# Patient Record
Sex: MALE | Race: WHITE | NOT HISPANIC OR LATINO | Employment: FULL TIME
[De-identification: names, ages, dates, MRNs, and addresses within clinical notes are randomized per-mention and may not be internally consistent; named-entity substitution may affect disease eponyms.]

---

## 2023-04-17 ENCOUNTER — TRANSCRIBE ORDERS (OUTPATIENT)
Dept: OTHER | Age: 68
End: 2023-04-17

## 2023-04-17 DIAGNOSIS — M25.552 LEFT HIP PAIN: Primary | ICD-10-CM

## 2023-05-10 ENCOUNTER — THERAPY VISIT (OUTPATIENT)
Dept: PHYSICAL THERAPY | Facility: CLINIC | Age: 68
End: 2023-05-10
Attending: INTERNAL MEDICINE
Payer: COMMERCIAL

## 2023-05-10 DIAGNOSIS — M25.552 HIP PAIN, LEFT: Primary | ICD-10-CM

## 2023-05-10 PROCEDURE — 97110 THERAPEUTIC EXERCISES: CPT | Mod: GP | Performed by: PHYSICAL THERAPIST

## 2023-05-10 PROCEDURE — 97161 PT EVAL LOW COMPLEX 20 MIN: CPT | Mod: GP | Performed by: PHYSICAL THERAPIST

## 2023-05-10 ASSESSMENT — ACTIVITIES OF DAILY LIVING (ADL)
RECREATIONAL_ACTIVITIES: SLIGHT DIFFICULTY
WALKING_DOWN_STEEP_HILLS: SLIGHT DIFFICULTY
HOS_ADL_HIGHEST_POTENTIAL_SCORE: 68
GOING_DOWN_1_FLIGHT_OF_STAIRS: NO DIFFICULTY AT ALL
TWISTING/PIVOTING_ON_INVOLVED_LEG: NO DIFFICULTY AT ALL
HOS_ADL_SCORE(%): 85.29
GETTING_INTO_AND_OUT_OF_AN_AVERAGE_CAR: MODERATE DIFFICULTY
SITTING_FOR_15_MINUTES: NO DIFFICULTY AT ALL
WALKING_INITIALLY: SLIGHT DIFFICULTY
ROLLING_OVER_IN_BED: SLIGHT DIFFICULTY
LIGHT_TO_MODERATE_WORK: NO DIFFICULTY AT ALL
HEAVY_WORK: NO DIFFICULTY AT ALL
HOS_ADL_COUNT: 17
HOW_WOULD_YOU_RATE_YOUR_CURRENT_LEVEL_OF_FUNCTION_DURING_YOUR_USUAL_ACTIVITIES_OF_DAILY_LIVING_FROM_0_TO_100_WITH_100_BEING_YOUR_LEVEL_OF_FUNCTION_PRIOR_TO_YOUR_HIP_PROBLEM_AND_0_BEING_THE_INABILITY_TO_PERFORM_ANY_OF_YOUR_USUAL_DAILY_ACTIVITIES?: 80
STEPPING_UP_AND_DOWN_CURBS: NO DIFFICULTY AT ALL
WALKING_UP_STEEP_HILLS: SLIGHT DIFFICULTY
GOING_UP_1_FLIGHT_OF_STAIRS: SLIGHT DIFFICULTY
PUTTING_ON_SOCKS_AND_SHOES: NO DIFFICULTY AT ALL
HOS_ADL_ITEM_SCORE_TOTAL: 58
GETTING_INTO_AND_OUT_OF_A_BATHTUB: NO DIFFICULTY AT ALL
DEEP_SQUATTING: NO DIFFICULTY AT ALL
WALKING_15_MINUTES_OR_GREATER: SLIGHT DIFFICULTY
STANDING_FOR_15_MINUTES: NO DIFFICULTY AT ALL
WALKING_APPROXIMATELY_10_MINUTES: SLIGHT DIFFICULTY

## 2023-05-10 NOTE — PROGRESS NOTES
"Physical Therapy Initial Evaluation  Subjective:  The history is provided by the patient. No  was used.   Patient Health History  Rajan Fernandez being seen for Left hip pain.     Problem began: 11/10/2022.   Problem occurred: gradual onset with no specific injury   Pain is reported as 3/10 on pain scale.  General health as reported by patient is good.  Pertinent medical history includes: none.   Red flags:  None as reported by patient.  Medical allergies: none.   Surgeries include:  None.    Current medications:  Anti-depressants, anti-inflammatory and cardiac medication.    Current occupation is .                     Therapist Generated HPI Evaluation  Problem details: -Gradual increase in left hip pain over the last few years  -Mostly in front of the hip  -Described as a deeper and \"vague\" pain  -No treatments have felt helpful  -Often walks about 3 miles (at least a few times per week) that can fatigue the hip.         Type of problem:  Left hip.    This is a chronic condition.  Condition occurred with:  Insidious onset.  Where condition occurred: for unknown reasons.  Patient reports pain:  Anterior and joint.  Pain is described as aching and is intermittent.  Pain radiates to:  No radiation. Pain is the same all the time.  Since onset symptoms are unchanged.  Associated symptoms:  Loss of motion/stiffness and loss of strength. Exacerbated by: getting into/out of car, prolonged activity; standing up after sitting for period.  Relieved by: not much helpful; avoiding provoking activities.  Imaging testing: none recently.  Past treatment: none. There was none improvement following previous treatment.  Restrictions due to condition include:  Working in normal job without restrictions.  Barriers include:  None as reported by patient.                        Objective:  System                                           Hip Evaluation  HIP AROM:    Flexion: Left: WNL    Right:  " WNL      Abduction: Left:  WNL    Right:  WNL      Internal Rotation: Left: 32    Right: 38  External Rotation: Left: 35    Right: 35        Hip Strength:    Flexion:   Left: 5/5   Pain:  Right: 5/5   Pain:                    Extension:  Left: 5/5  Pain:Right: 5/5    Pain:                                     General     ROS    Assessment/Plan:    Patient is a 67 year old male with left side hip complaints.    Patient has the following significant findings with corresponding treatment plan.                Diagnosis 1:  Chronic left hip pain  Pain -  hot/cold therapy, manual therapy, self management, education, directional preference exercise and home program  Decreased ROM/flexibility - manual therapy, therapeutic exercise and home program  Decreased joint mobility - manual therapy, therapeutic exercise and home program  Decreased strength - therapeutic exercise, therapeutic activities and home program  Impaired muscle performance - neuro re-education and home program  Decreased function - therapeutic activities and home program    Therapy Evaluation Codes:   Cumulative Therapy Evaluation is: Low complexity.    Previous and current functional limitations:  (See Goal Flow Sheet for this information)    Short term and Long term goals: (See Goal Flow Sheet for this information)     Communication ability:  Patient appears to be able to clearly communicate and understand verbal and written communication and follow directions correctly.  Treatment Explanation - The following has been discussed with the patient:   RX ordered/plan of care  Anticipated outcomes  Possible risks and side effects  This patient would benefit from PT intervention to resume normal activities.   Rehab potential is good.    Frequency:  1 X week, once daily  Duration:  for 8 weeks  Discharge Plan:  Achieve all LTG.  Independent in home treatment program.  Reach maximal therapeutic benefit.    Please refer to the daily flowsheet for treatment today,  total treatment time and time spent performing 1:1 timed codes.

## 2023-05-22 ENCOUNTER — THERAPY VISIT (OUTPATIENT)
Dept: PHYSICAL THERAPY | Facility: CLINIC | Age: 68
End: 2023-05-22
Payer: COMMERCIAL

## 2023-05-22 DIAGNOSIS — M25.552 HIP PAIN, LEFT: Primary | ICD-10-CM

## 2023-05-22 PROCEDURE — 97112 NEUROMUSCULAR REEDUCATION: CPT | Mod: GP | Performed by: PHYSICAL THERAPIST

## 2023-05-22 PROCEDURE — 97110 THERAPEUTIC EXERCISES: CPT | Mod: GP | Performed by: PHYSICAL THERAPIST

## 2023-06-14 ENCOUNTER — THERAPY VISIT (OUTPATIENT)
Dept: PHYSICAL THERAPY | Facility: CLINIC | Age: 68
End: 2023-06-14
Payer: COMMERCIAL

## 2023-06-14 DIAGNOSIS — M25.552 HIP PAIN, LEFT: Primary | ICD-10-CM

## 2023-06-14 PROCEDURE — 97140 MANUAL THERAPY 1/> REGIONS: CPT | Mod: GP | Performed by: PHYSICAL THERAPIST

## 2023-06-14 PROCEDURE — 97112 NEUROMUSCULAR REEDUCATION: CPT | Mod: GP | Performed by: PHYSICAL THERAPIST

## 2023-06-14 PROCEDURE — 97110 THERAPEUTIC EXERCISES: CPT | Mod: GP | Performed by: PHYSICAL THERAPIST

## 2023-06-30 ENCOUNTER — THERAPY VISIT (OUTPATIENT)
Dept: PHYSICAL THERAPY | Facility: CLINIC | Age: 68
End: 2023-06-30
Payer: COMMERCIAL

## 2023-06-30 DIAGNOSIS — M25.552 HIP PAIN, LEFT: Primary | ICD-10-CM

## 2023-06-30 PROCEDURE — 97112 NEUROMUSCULAR REEDUCATION: CPT | Mod: GP | Performed by: PHYSICAL THERAPIST

## 2023-06-30 PROCEDURE — 97110 THERAPEUTIC EXERCISES: CPT | Mod: GP | Performed by: PHYSICAL THERAPIST

## 2023-06-30 ASSESSMENT — ACTIVITIES OF DAILY LIVING (ADL)
HOS_ADL_SCORE(%): 94.12
WALKING_DOWN_STEEP_HILLS: NO DIFFICULTY AT ALL
GOING_DOWN_1_FLIGHT_OF_STAIRS: NO DIFFICULTY AT ALL
GETTING_INTO_AND_OUT_OF_A_BATHTUB: NO DIFFICULTY AT ALL
RECREATIONAL_ACTIVITIES: NO DIFFICULTY AT ALL
HOS_ADL_HIGHEST_POTENTIAL_SCORE: 68
LIGHT_TO_MODERATE_WORK: NO DIFFICULTY AT ALL
TWISTING/PIVOTING_ON_INVOLVED_LEG: SLIGHT DIFFICULTY
ROLLING_OVER_IN_BED: NO DIFFICULTY AT ALL
WALKING_INITIALLY: NO DIFFICULTY AT ALL
PUTTING_ON_SOCKS_AND_SHOES: NO DIFFICULTY AT ALL
SITTING_FOR_15_MINUTES: NO DIFFICULTY AT ALL
HOW_WOULD_YOU_RATE_YOUR_CURRENT_LEVEL_OF_FUNCTION_DURING_YOUR_USUAL_ACTIVITIES_OF_DAILY_LIVING_FROM_0_TO_100_WITH_100_BEING_YOUR_LEVEL_OF_FUNCTION_PRIOR_TO_YOUR_HIP_PROBLEM_AND_0_BEING_THE_INABILITY_TO_PERFORM_ANY_OF_YOUR_USUAL_DAILY_ACTIVITIES?: 75
GETTING_INTO_AND_OUT_OF_AN_AVERAGE_CAR: SLIGHT DIFFICULTY
STEPPING_UP_AND_DOWN_CURBS: NO DIFFICULTY AT ALL
HOS_ADL_ITEM_SCORE_TOTAL: 64
HEAVY_WORK: NO DIFFICULTY AT ALL
STANDING_FOR_15_MINUTES: NO DIFFICULTY AT ALL
GOING_UP_1_FLIGHT_OF_STAIRS: SLIGHT DIFFICULTY
WALKING_UP_STEEP_HILLS: SLIGHT DIFFICULTY
WALKING_APPROXIMATELY_10_MINUTES: NO DIFFICULTY AT ALL
HOS_ADL_COUNT: 17
WALKING_15_MINUTES_OR_GREATER: NO DIFFICULTY AT ALL
DEEP_SQUATTING: NO DIFFICULTY AT ALL

## 2023-07-28 ENCOUNTER — THERAPY VISIT (OUTPATIENT)
Dept: PHYSICAL THERAPY | Facility: CLINIC | Age: 68
End: 2023-07-28
Payer: COMMERCIAL

## 2023-07-28 DIAGNOSIS — M25.552 HIP PAIN, LEFT: Primary | ICD-10-CM

## 2023-07-28 PROCEDURE — 97112 NEUROMUSCULAR REEDUCATION: CPT | Mod: GP | Performed by: PHYSICAL THERAPIST

## 2023-07-28 PROCEDURE — 97110 THERAPEUTIC EXERCISES: CPT | Mod: GP | Performed by: PHYSICAL THERAPIST

## 2023-09-01 ENCOUNTER — THERAPY VISIT (OUTPATIENT)
Dept: PHYSICAL THERAPY | Facility: CLINIC | Age: 68
End: 2023-09-01
Payer: COMMERCIAL

## 2023-09-01 DIAGNOSIS — M25.552 HIP PAIN, LEFT: Primary | ICD-10-CM

## 2023-09-01 PROCEDURE — 97110 THERAPEUTIC EXERCISES: CPT | Mod: GP | Performed by: PHYSICAL THERAPIST

## 2023-09-01 PROCEDURE — 97112 NEUROMUSCULAR REEDUCATION: CPT | Mod: GP | Performed by: PHYSICAL THERAPIST

## 2023-09-01 PROCEDURE — 97140 MANUAL THERAPY 1/> REGIONS: CPT | Mod: GP | Performed by: PHYSICAL THERAPIST

## 2023-11-28 ENCOUNTER — TRANSFERRED RECORDS (OUTPATIENT)
Dept: HEALTH INFORMATION MANAGEMENT | Facility: CLINIC | Age: 68
End: 2023-11-28

## 2024-02-01 ENCOUNTER — TRANSFERRED RECORDS (OUTPATIENT)
Dept: HEALTH INFORMATION MANAGEMENT | Facility: CLINIC | Age: 69
End: 2024-02-01

## 2024-03-14 PROBLEM — M25.552 HIP PAIN, LEFT: Status: RESOLVED | Noted: 2023-05-10 | Resolved: 2024-03-14

## 2024-03-14 NOTE — PROGRESS NOTES
09/01/23 0500   Appointment Info   Signing clinician's name / credentials rohitsonali duval PT   Total/Authorized Visits 8   Visits Used 5   Medical Diagnosis left hip pain   PT Tx Diagnosis left hip joint pain   Progress Note/Certification   Progress Note Due Date 09/01/23   Progress Note Completed Date 05/10/23   PT Goal 1   Goal Identifier 1   Goal Description self cares, transferes pl 0/10   Rationale to maximize safety and independence with performance of ADLs and functional tasks;to maximize safety and independence within the home;to maximize safety and independence with transportation;to maximize safety and independence with self cares   Goal Progress self cares, transfers pl 1/10   Target Date 09/05/23  (improving decrease in frequency)   Subjective Report   Subjective Report I have some good days/bad days.  I am not sure what it the causes trying to work on my gait.  I am about 80% of normal   Objective Measures   Objective Measures Objective Measure 1;Objective Measure 2;Objective Measure 3   Objective Measure 1   Objective Measure hip abduction, gluteal (extension)   Details left 5-/5 better recuritment, left 5-/5   Objective Measure 2   Objective Measure balance   Details left 30 sec, right 25 sec   Objective Measure 3   Objective Measure hip ROM   Details general WFL, tightness in ASIS, quads and anterior thigh   Treatment Interventions (PT)   Interventions Therapeutic Procedure/Exercise;Neuromuscular Re-education;Manual Therapy   Therapeutic Procedure/Exercise   Therapeutic Procedures: strength, endurance, ROM, flexibility minutes (32707) 18   Therapeutic Procedures Ther Proc 2;Ther Proc 3   Ther Proc 1 PROM left hip in supine/prone   Ther Proc 1 - Details bilateral   Ther Proc 2 hip extension standing with TB   Ther Proc 2 - Details HEP red x 15   PTRx Ther Proc 1 Bridging #4 bend knee   PTRx Ther Proc 1 - Details cueing to watch posture and progression   PTRx Ther Proc 2 Hip Abduction Straight  Leg Raise   PTRx Ther Proc 2 - Details REV x20 to fatigue watching leg position resting between each rep   PTRx Ther Proc 3 Clamshell with Theraband   PTRx Ther Proc 3 - Details REV uses manual pressure instead of pain pillow between the knees  using red 15 x working to 30 1x/ day cueing for hip control and no increase in pain   PTRx Ther Proc 4 Piriformis Stretch Above 90 Degress Supine   PTRx Ther Proc 4 - Details manual stretching  move knee to opposite side moving toward your hip move knee towards shoulder 2 x 30 sec watching low back position 2x/ day   PTRx Ther Proc 5 Pretzel Stretch   PTRx Ther Proc 5 - Details watching back and leg position 2x 30 sec x 2x/ day   PTRx Ther Proc 6 kneeling hip flexos with ceuing for core adn knee position   PTRx Ther Proc 6 - Details how to stretch with walking 20 sec x2 reviewed watching pevlis stability   PTRx Ther Proc 7  Hip Flexor Stretch Petar Test Position   PTRx Ther Proc 7 - Details reviwed, or prone with foot on the ground tightness in quadriceps   Skilled Intervention cueing for posture and stretching to anterior thigh/ITB   Patient Response/Progress increase in recruitment almost normal   Ther Proc 3 foam roller   Ther Proc 3 - Details quadriceps/ITB   Neuromuscular Re-education   Neuromuscular re-ed of mvmt, balance, coord, kinesthetic sense, posture, proprioception minutes (88094) 10   Neuromuscular Re-education Neuro Re-ed 3;Neuro Re-ed 4;Neuro Re-ed 5;Neuro Re-ed 6   Neuro Re-ed 1 step up 5/6 inch step   Neuro Re-ed 1 - Details cueing for HEP gluteal control to do through out normal activity   Neuro Re-ed 3 SLS   Neuro Re-ed 3 - Details 1 legged balance 2 x 30 sec with even ground progression to carpet, yoga mat, watching contorl, pillow, turn the head, closed eyes   Neuro Re-ed 4 BOSU   Neuro Re-ed 4 - Details HEP dome side 2 legged with counter for balance progression to head turning, flat side minisquats watching L/E alignement   Neuro Re-ed 5 side stepping  and backwards   Neuro Re-ed 5 - Details with red theraband 40 feet x 6   PTRx Neuro Re-ed 1 lunges forward, sideways   PTRx Neuro Re-ed 1 - Details HEP on to BOSU watching hip and knee alignment   PTRx Neuro Re-ed 2 Education Sheet Hip    PTRx Neuro Re-ed 2 - Details walking forward, backwards, sideways and activating of gluteal and holding posture, cueing for gluteal and hip position increase with bands, increase in step length   Skilled Intervention general activity cueing to avoid hip flexors activity   Patient Response/Progress tolerated fair   Neuro Re-ed 6 walking   Neuro Re-ed 6 - Details forward, gluteal, watching postion slope of the road, try walking backwards to deactivate the hip flexors   Manual Therapy   Manual Therapy: Mobilization, MFR, MLD, friction massage minutes ([id number]) 10   Manual Therapy Manual Therapy 2   Manual Therapy 1 prone   Manual Therapy 1 - Details prone gr II-III, hip extension gr III   Manual Therapy 2 STM   Manual Therapy 2 - Details quad, asis, hip fleors, (left)   Skilled Intervention manul to anterior hip and quadriceps   Patient Response/Progress tolerate pressure   Education   Learner/Method Patient   Education Comments updated PTRX, paper daily activity to use exercises   Plan   Home program balance, high level activity, walking   Updates to plan of care BOSU. lunges and balance   Plan for next session not yet   Comments   Comments Paper PTRX.   Total Session Time   Timed Code Treatment Minutes 38   Total Treatment Time (sum of timed and untimed services) 38       DISCHARGE  Reason for Discharge: Patient has failed to schedule further appointments.    Equipment Issued:     Discharge Plan: Patient to continue home program.    Referring Provider:  Jaxon Acuna

## 2024-07-24 ENCOUNTER — TRANSFERRED RECORDS (OUTPATIENT)
Dept: HEALTH INFORMATION MANAGEMENT | Facility: CLINIC | Age: 69
End: 2024-07-24

## 2024-10-11 ENCOUNTER — TRANSFERRED RECORDS (OUTPATIENT)
Dept: HEALTH INFORMATION MANAGEMENT | Facility: CLINIC | Age: 69
End: 2024-10-11
Payer: COMMERCIAL

## 2024-10-14 ENCOUNTER — MEDICAL CORRESPONDENCE (OUTPATIENT)
Dept: HEALTH INFORMATION MANAGEMENT | Facility: CLINIC | Age: 69
End: 2024-10-14
Payer: COMMERCIAL

## 2024-10-14 ENCOUNTER — TRANSCRIBE ORDERS (OUTPATIENT)
Dept: CALL CENTER | Age: 69
End: 2024-10-14
Payer: COMMERCIAL

## 2024-10-14 DIAGNOSIS — K58.0 IRRITABLE BOWEL SYNDROME WITH DIARRHEA: Primary | ICD-10-CM
